# Patient Record
Sex: MALE | Race: WHITE
[De-identification: names, ages, dates, MRNs, and addresses within clinical notes are randomized per-mention and may not be internally consistent; named-entity substitution may affect disease eponyms.]

---

## 2018-08-23 ENCOUNTER — HOSPITAL ENCOUNTER (OUTPATIENT)
Dept: HOSPITAL 45 - C.RAD | Age: 63
Discharge: HOME | End: 2018-08-23
Attending: PHYSICIAN ASSISTANT
Payer: COMMERCIAL

## 2018-08-23 DIAGNOSIS — Z98.890: ICD-10-CM

## 2018-08-23 DIAGNOSIS — K21.9: Primary | ICD-10-CM

## 2018-08-23 NOTE — DIAGNOSTIC IMAGING REPORT
GI SERIES W/AIR ROUTINE



CLINICAL HISTORY: 63 years-old Male with GASTROESOPHAGEAL REFLUX.  Patient

complains of reflux with reported history of prior Nissen fundoplication



TECHNIQUE:  A standard air contrast upper GI series was performed following

administration of barium and effervescent crystals.  Multiple spot fluoroscopic

images were obtained and provided for review.



COMPARISON STUDY: None available



FLUOROSCOPY TIME: 1.5 minutes. 29 total images were submitted.



FINDINGS: 

   

The patient swallowed barium without difficulty.  No aspiration was definitively

visualized.  The esophagus distended normally with barium and effervescent

crystals.  No strictures, mucosal ulcerations, or intraluminal mass lesions were

identified involving the esophagus.  There was no significant gastroesophageal

reflux.  Barium was seen to flow freely through the gastroesophageal junction. 

No active reflux was demonstrated with Valsalva maneuver.  Evaluation of the

stomach demonstrates no gastric mucosal irregularity or filling defect. No

definite evidence of a prior Nissen fundoplication.



The duodenal bulb and sweep appear unremarkable.  



IMPRESSION: Unremarkable upper GI without significant gastroesophageal reflux

identified.







The above report was generated using voice recognition software. It may contain

grammatical, syntax or spelling errors.











Electronically signed by:  Rodolfo Sethi M.D.

8/23/2018 10:43 AM



Dictated Date/Time:  8/23/2018 10:40 AM